# Patient Record
Sex: FEMALE | Race: OTHER | HISPANIC OR LATINO | URBAN - METROPOLITAN AREA
[De-identification: names, ages, dates, MRNs, and addresses within clinical notes are randomized per-mention and may not be internally consistent; named-entity substitution may affect disease eponyms.]

---
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
## 2025-01-09 NOTE — ED BEHAVIORAL HEALTH ASSESSMENT NOTE - REFERRED BY
Kiarra Mata discharged to facility accompanied by EMS.   Patient provided with the following educational materials upon discharge:  doxycycline, prednisone, augment, and pneumonia.   Valuables and belongings sent with patient.   discharge summary, discharge instructions, and medications reviewed with patient and EMS.  Patient and EMS verbalized understanding    Patient instructed to  lokelma prescription from our outpatient pharmacy tomorrow, repeat BMP by Friday, and follow up with PCP prior to resuming enalapril and spironolactone. Patient verbalized understanding.    School